# Patient Record
Sex: FEMALE | Race: WHITE | URBAN - METROPOLITAN AREA
[De-identification: names, ages, dates, MRNs, and addresses within clinical notes are randomized per-mention and may not be internally consistent; named-entity substitution may affect disease eponyms.]

---

## 2022-07-19 ENCOUNTER — OFFICE VISIT (OUTPATIENT)
Dept: URBAN - METROPOLITAN AREA CLINIC 85 | Facility: CLINIC | Age: 47
End: 2022-07-19

## 2022-07-19 DIAGNOSIS — H43.393 OTHER VITREOUS OPACITIES, BILATERAL: Primary | ICD-10-CM

## 2022-07-19 DIAGNOSIS — H52.4 PRESBYOPIA: ICD-10-CM

## 2022-07-19 PROCEDURE — 92004 COMPRE OPH EXAM NEW PT 1/>: CPT | Performed by: OPTOMETRIST

## 2022-07-19 ASSESSMENT — INTRAOCULAR PRESSURE
OD: 14
OS: 14

## 2022-07-19 ASSESSMENT — VISUAL ACUITY
OD: 20/20
OS: 20/20

## 2022-07-19 ASSESSMENT — KERATOMETRY
OD: 45.00
OS: 45.50

## 2022-07-19 NOTE — IMPRESSION/PLAN
Impression: Presbyopia: H52.4. Plan: Discussed findings with patient. Discussed option of new spectacle RX versus OTC readers. Patient elects to use OTC readers for now.